# Patient Record
Sex: FEMALE | Race: AMERICAN INDIAN OR ALASKA NATIVE
[De-identification: names, ages, dates, MRNs, and addresses within clinical notes are randomized per-mention and may not be internally consistent; named-entity substitution may affect disease eponyms.]

---

## 2019-06-29 ENCOUNTER — HOSPITAL ENCOUNTER (EMERGENCY)
Dept: HOSPITAL 46 - ED | Age: 39
LOS: 1 days | Discharge: HOME | End: 2019-06-30
Payer: COMMERCIAL

## 2019-06-29 VITALS — WEIGHT: 169.49 LBS | HEIGHT: 58 IN | BODY MASS INDEX: 35.58 KG/M2

## 2019-06-29 DIAGNOSIS — Z90.49: ICD-10-CM

## 2019-06-29 DIAGNOSIS — N39.0: Primary | ICD-10-CM

## 2019-06-29 DIAGNOSIS — F17.200: ICD-10-CM

## 2019-06-29 DIAGNOSIS — Z88.0: ICD-10-CM

## 2020-03-08 NOTE — XMS
PreManage Notification: TIMOTHY ROWE MRN:[id number]
 
Security Information
 
Security Events
No recent Security Events currently on file
 
 
 
CRITERIA MET
------------
- [facility] - 2 Visits in 30 Days
 
 
CARE PROVIDERS
There are no care providers on record at this time.
 
[facility] has no Care Guidelines for this patient.
 
[facility] VISIT COUNT (12 MO.)
-------------------------------------------------------------------------------------
[facility]
-------------------------------------------------------------------------------------
TOTAL 3
-------------------------------------------------------------------------------------
NOTE: Visits indicate total known visits.
 
ED/C VISIT TRACKING (12 MO.)
-------------------------------------------------------------------------------------
[facility]2020 07[facility]
 
[facility]
 
COMPLAINT:
- FOOT PAIN, INJ
-------------------------------------------------------------------------------------
02/25/2020 19:57
[facility] [place]
 
TYPE: Emergency
 
COMPLAINT:
- POSS EAR INFECTION/HEADACHE
 
DIAGNOSES:
- Cough
- Otitis media, unspecified, bilateral
- Nicotine dependence, unspecified, uncomplicated
- Allergy status to penicillin
-------------------------------------------------------------------------------------
06/29/2019 22:26
[facility] [place]
 
TYPE: Emergency
 
COMPLAINT:
- RIGHT FLANK PAIN/NON INJURY
 
 
DIAGNOSES:
- Acquired absence of other specified parts of digestive tract
- Urinary tract infection, site not specified
- Unspecified abdominal pain
- Allergy status to penicillin
- Nicotine dependence, unspecified, uncomplicated
-------------------------------------------------------------------------------------
 
 
INPATIENT VISIT TRACKING (12 MO.)
No inpatient visits to display in this time frame
 
[email or web address]